# Patient Record
Sex: MALE | Race: WHITE
[De-identification: names, ages, dates, MRNs, and addresses within clinical notes are randomized per-mention and may not be internally consistent; named-entity substitution may affect disease eponyms.]

---

## 2018-02-13 ENCOUNTER — HOSPITAL ENCOUNTER (OUTPATIENT)
Dept: HOSPITAL 92 - LABBT | Age: 78
Discharge: HOME | End: 2018-02-13
Attending: ORTHOPAEDIC SURGERY
Payer: MEDICARE

## 2018-02-13 ENCOUNTER — HOSPITAL ENCOUNTER (INPATIENT)
Dept: HOSPITAL 92 - SURG A | Age: 78
LOS: 15 days | Discharge: HOME | DRG: 470 | End: 2018-02-28
Attending: ORTHOPAEDIC SURGERY | Admitting: ORTHOPAEDIC SURGERY
Payer: MEDICARE

## 2018-02-13 VITALS — BODY MASS INDEX: 38 KG/M2

## 2018-02-13 DIAGNOSIS — Z95.1: ICD-10-CM

## 2018-02-13 DIAGNOSIS — E03.9: ICD-10-CM

## 2018-02-13 DIAGNOSIS — Z01.812: ICD-10-CM

## 2018-02-13 DIAGNOSIS — Z91.041: ICD-10-CM

## 2018-02-13 DIAGNOSIS — Z79.82: ICD-10-CM

## 2018-02-13 DIAGNOSIS — I10: ICD-10-CM

## 2018-02-13 DIAGNOSIS — E66.9: ICD-10-CM

## 2018-02-13 DIAGNOSIS — Z86.73: ICD-10-CM

## 2018-02-13 DIAGNOSIS — Z85.828: ICD-10-CM

## 2018-02-13 DIAGNOSIS — M17.11: ICD-10-CM

## 2018-02-13 DIAGNOSIS — E11.42: ICD-10-CM

## 2018-02-13 DIAGNOSIS — Z96.652: ICD-10-CM

## 2018-02-13 DIAGNOSIS — M25.461: ICD-10-CM

## 2018-02-13 DIAGNOSIS — Z95.820: ICD-10-CM

## 2018-02-13 DIAGNOSIS — N40.0: ICD-10-CM

## 2018-02-13 DIAGNOSIS — E78.5: ICD-10-CM

## 2018-02-13 DIAGNOSIS — G47.30: ICD-10-CM

## 2018-02-13 DIAGNOSIS — M17.11: Primary | ICD-10-CM

## 2018-02-13 DIAGNOSIS — Z01.810: Primary | ICD-10-CM

## 2018-02-13 DIAGNOSIS — Z79.4: ICD-10-CM

## 2018-02-13 PROCEDURE — 36416 COLLJ CAPILLARY BLOOD SPEC: CPT

## 2018-02-13 PROCEDURE — 84132 ASSAY OF SERUM POTASSIUM: CPT

## 2018-02-13 PROCEDURE — 93005 ELECTROCARDIOGRAM TRACING: CPT

## 2018-02-13 PROCEDURE — C1713 ANCHOR/SCREW BN/BN,TIS/BN: HCPCS

## 2018-02-13 PROCEDURE — 94640 AIRWAY INHALATION TREATMENT: CPT

## 2018-02-13 PROCEDURE — 84295 ASSAY OF SERUM SODIUM: CPT

## 2018-02-13 PROCEDURE — 93010 ELECTROCARDIOGRAM REPORT: CPT

## 2018-02-13 PROCEDURE — S0020 INJECTION, BUPIVICAINE HYDRO: HCPCS

## 2018-02-13 PROCEDURE — C1776 JOINT DEVICE (IMPLANTABLE): HCPCS

## 2018-02-13 PROCEDURE — 80048 BASIC METABOLIC PNL TOTAL CA: CPT

## 2018-02-13 PROCEDURE — 36415 COLL VENOUS BLD VENIPUNCTURE: CPT

## 2018-02-13 PROCEDURE — 85027 COMPLETE CBC AUTOMATED: CPT

## 2018-02-13 PROCEDURE — 87081 CULTURE SCREEN ONLY: CPT

## 2018-02-13 PROCEDURE — 83930 ASSAY OF BLOOD OSMOLALITY: CPT

## 2018-02-19 ENCOUNTER — HOSPITAL ENCOUNTER (OUTPATIENT)
Dept: HOSPITAL 92 - LABBT | Age: 78
Discharge: HOME | End: 2018-02-19
Attending: ORTHOPAEDIC SURGERY
Payer: MEDICARE

## 2018-02-19 DIAGNOSIS — M17.11: ICD-10-CM

## 2018-02-19 DIAGNOSIS — Z01.818: Primary | ICD-10-CM

## 2018-02-19 LAB
ANION GAP SERPL CALC-SCNC: 10 MMOL/L (ref 10–20)
BUN SERPL-MCNC: 15 MG/DL (ref 8.4–25.7)
CALCIUM SERPL-MCNC: 9.8 MG/DL (ref 7.8–10.44)
CHLORIDE SERPL-SCNC: 96 MMOL/L (ref 98–107)
CO2 SERPL-SCNC: 26 MMOL/L (ref 23–31)
CREAT CL PREDICTED SERPL C-G-VRATE: 0 ML/MIN (ref 70–130)
CRYSTAL-AUWI FLAG: 0 (ref 0–15)
GLUCOSE SERPL-MCNC: 119 MG/DL (ref 83–110)
HEV IGM SER QL: 0.6 (ref 0–7.99)
HGB BLD-MCNC: 13.9 G/DL (ref 14–18)
HYALINE CASTS #/AREA URNS LPF: (no result) LPF
INR PPP: 1
MCH RBC QN AUTO: 31.5 PG (ref 27–31)
MCV RBC AUTO: 90.8 FL (ref 80–94)
PATHC CAST-AUWI FLAG: 0.13 (ref 0–2.49)
PLATELET # BLD AUTO: 147 THOU/UL (ref 130–400)
POTASSIUM SERPL-SCNC: 4.7 MMOL/L (ref 3.5–5.1)
PROTHROMBIN TIME: 13.5 SEC (ref 12–14.7)
RBC # BLD AUTO: 4.4 MILL/UL (ref 4.7–6.1)
RBC UR QL AUTO: (no result) HPF (ref 0–3)
SODIUM SERPL-SCNC: 127 MMOL/L (ref 136–145)
SP GR UR STRIP: 1.02 (ref 1–1.04)
SPERM-AUWI FLAG: 0 (ref 0–9.9)
WBC # BLD AUTO: 8.7 THOU/UL (ref 4.8–10.8)
YEAST-AUWI FLAG: 0 (ref 0–25)

## 2018-02-19 PROCEDURE — 85027 COMPLETE CBC AUTOMATED: CPT

## 2018-02-19 PROCEDURE — 86850 RBC ANTIBODY SCREEN: CPT

## 2018-02-19 PROCEDURE — 86901 BLOOD TYPING SEROLOGIC RH(D): CPT

## 2018-02-19 PROCEDURE — 86900 BLOOD TYPING SEROLOGIC ABO: CPT

## 2018-02-19 PROCEDURE — 85610 PROTHROMBIN TIME: CPT

## 2018-02-19 PROCEDURE — 81001 URINALYSIS AUTO W/SCOPE: CPT

## 2018-02-19 PROCEDURE — 80048 BASIC METABOLIC PNL TOTAL CA: CPT

## 2018-02-22 NOTE — HP
HISTORY OF PRESENT ILLNESS:  The patient is a 77-year-old male with a long history of progressive deg
enerative arthritis of the right knee, unresponsive to conservative treatment including rest, restric
tion of activities, and previous cortisone injections.  The pain is now interfering with day-to-day a
ctivities.  He has had previous left total knee replacement in 2011 with good results.

 

PAST MEDICAL HISTORY:  The patient has a history of diabetes, peripheral vascular disease and previou
s coronary bypass.  He has also had previous femoral popliteal grafting on the right side with resolu
tion of his symptoms of cramping and pain, but he continues to have knee pain.  He has been seen and 
cleared for surgery by his primary care physician, Dr. Salinas, and also his cardiologist in [place].

 

CURRENT MEDICATIONS:  Include Lantus, insulin, Synthroid, aspirin, Crestor, Bystolic.

 

ALLERGIES:  He is allergic to IODINE.

 

FAMILY HISTORY:  Otherwise unremarkable.

 

SOCIAL HISTORY:  Otherwise unremarkable.

 

REVIEW OF SYSTEMS:  Otherwise unremarkable.

 

PHYSICAL EXAMINATION:

GENERAL:  Reveals an elderly, heavyset male.

HEENT:  Unremarkable.

NECK:  Supple.

CHEST:  Clear.

HEART:  Regular rate and rhythm.

ABDOMEN:  Soft, nontender.

RECTAL/GENITAL:  Deferred.

EXTREMITIES:  Pertinent findings of the right knee:  There is puffiness but no definite effusion.  Th
ere is mild varus.  There is tenderness and crepitus over the medial joint line.  Range of motion is 
0-120 degrees.  There is no instability.  There is good capillary refill.  I cannot palpate distal pu
lses.  Slight right antalgic gait.  Neurovascular exam is intact.

 

LABORATORY AND X-RAY FINDINGS:  X-rays of the right knee reveal bone on bone collapse medially and va
scular calcifications.

 

IMPRESSION:

1.  Degenerative arthritis, right knee.

2.  Peripheral vascular disease.

3.  Atherosclerotic cardiovascular disease.

4.  Insulin-dependent diabetes.

5.  Status post left total knee replacement.

 

PLAN:  Right total knee replacement.  The nature of the surgery, length of recovery, and potential co
mplications such as infection, loss of motion, incomplete relief, delayed wound healing, neurovascula
r injury, thromboembolic phenomenon, possible transfusion, and need for revision have been discussed 
in detail.  Because of his other medical problems, he may require longer than usual hospital stay.

## 2018-02-26 LAB
POTASSIUM SERPL-SCNC: 4.3 MMOL/L (ref 3.5–5.1)
SODIUM SERPL-SCNC: 134 MMOL/L (ref 136–145)

## 2018-02-26 PROCEDURE — 3E0T3BZ INTRODUCTION OF ANESTHETIC AGENT INTO PERIPHERAL NERVES AND PLEXI, PERCUTANEOUS APPROACH: ICD-10-PCS | Performed by: ORTHOPAEDIC SURGERY

## 2018-02-26 PROCEDURE — 0SRC0J9 REPLACEMENT OF RIGHT KNEE JOINT WITH SYNTHETIC SUBSTITUTE, CEMENTED, OPEN APPROACH: ICD-10-PCS | Performed by: ORTHOPAEDIC SURGERY

## 2018-02-26 RX ADMIN — HYDROCODONE BITARTRATE AND ACETAMINOPHEN PRN TAB: 10; 325 TABLET ORAL at 22:25

## 2018-02-26 RX ADMIN — Medication SCH: at 19:48

## 2018-02-26 RX ADMIN — Medication SCH GM: at 18:31

## 2018-02-26 RX ADMIN — Medication SCH GM: at 20:18

## 2018-02-26 RX ADMIN — ALOGLIPTIN SCH MG: 6.25 TABLET, FILM COATED ORAL at 18:59

## 2018-02-26 RX ADMIN — HYDROCODONE BITARTRATE AND ACETAMINOPHEN PRN TAB: 10; 325 TABLET ORAL at 18:30

## 2018-02-26 RX ADMIN — ASPIRIN SCH MG: 81 TABLET ORAL at 20:22

## 2018-02-26 NOTE — OP
DATE OF PROCEDURE:  02/26/2018

 

PREOPERATIVE DIAGNOSIS:  End-stage tricompartmental osteoarthritis, right knee.

 

POSTOPERATIVE DIAGNOSIS:  End-stage tricompartmental osteoarthritis, right knee.

 

OPERATIVE PROCEDURE:  Cemented cruciate-sparing computer-assisted navigated 
right total knee arthroplasty.

 

SURGEON:  Cade Colvin M.D.

 

ASSISTANT:  Clayton Edwards PA-C.

 

ANESTHESIA:  General via laryngeal mask airway augmented with indwelling 
adductor canal block and a single shot sciatic block.

 

COMPONENTS USED:  [employer] cemented cruciate-sparing size 
6 primary femoral component with size 6 universal cemented baseplate.  An 11 mm 
polyethylene fixed bearing insert and A35 patellar button.

 

TOURNIQUET:  Not used.

 

ESTIMATED BLOOD LOSS:  400 mL.

 

FINDINGS:  End-stage severe degenerative tricompartmental disease, bone on bone 
arthrosis, periarticular osteophyte formation, large serous effusion, 
hypertrophic synovium.

 

DRAINS:  None.

 

SPECIMENS:  None.

 

COMPLICATIONS:  None.

 

COUNTS:  Correct.

 

INDICATIONS FOR SURGERY:  Nayan is a 77-year-old white male who has right 
knee pain amplified with standing and walking about 5-7 years.  He has failed 
conservative management and elected to proceed with total knee arthroplasty as 
definitive treatment of his pain.  Tourniquet was not used due to the patient's 
known femoropopliteal arterial bypass and known peripheral vascular disease.

 

PROCEDURE IN DETAIL:  After informed consent was obtained in the preoperative 
holding area.  The patient was taken to the operative suite where general 
anesthesia was induced.  Once adequate level of general anesthesia was obtained
, the patient was positioned and a well-padded tourniquet was placed around the 
right proximal thigh.  The right lower extremity was then prepped and draped in 
the usual sterile fashion.  A midline longitudinal incision was then made 
directly over the patella extending two fingerbreadths above the superior pole 
of the patella and two fingerbreadths inferior to the inferior patellar pole of 
the patella.  Deeper subcutaneous layers were dissected sharply and local 
bleeding was controlled with Bovie electrocautery.  A quad tendon longitudinal 
split was then made sharply and a median parapatellar arthrotomy was carried 
out both sharp and with Bovie electrocautery, carried down to one fingerbreadth 
medial to the tibial tubercle.  The knee was then placed into flexion and the 
patella was everted nicely, and a copious fat pad ectomy was performed allowing 
for greater exposure of the tibia.  The computer-assisted distal femoral 
fiducial was then placed and pinned firmly, and the distal femoral cutting 
guide was pinned firmly into place.  The oscillating saw was then used to 
remove the appropriate amount of bone.  The 4-in-1 cutting block was then 
placed on the distal femur and the oscillating saw was used to remove the 
appropriate amount of bone off of the anterior, posterior, and chamfer cuts.  
After completion of the chamfer cuts, the box-cutting guide was placed, 
malleted firmly into place and pinned securely, and an osteotome was used to 
make the distal cut and the oscillating saw was then used to make the medial 
and lateral box cuts.  This came out quite nicely and was removed with Bovie 
electrocautery, and the oscillating saw was then used to broaden the lateral 
medial walls of the box cut.  After completion of bone cuts, the anterior 
cruciate ligament was resected sharply and the posterior cruciate ligament 
retractor was placed and the tibia was subluxed for better exposure.  Partial 
meniscectomies were carried out, and the tibial computer-assisted fiducial was 
pinned, and the cutting guide was placed.  Oscillating saw was then used to 
remove the bone with Hohmann retractors used to take care and protect the 
collateral ligaments.  After the tibial resection was performed, a laminar 
 was placed in between the freshened bone cuts.  The knee placed at 90 
degrees and further bilateral meniscectomies were carried out, and the curved 
osteotome and curettage was used to remove any excess bone spurs in the 
posterior compartment.  The trial femoral component, tibial baseplate were 
placed with the appropriate polyethylene trial insert with an appropriate 
polyethylene spacer and patellar button.  The knee was taken through full range 
of motion with flexion and extension from 0-90 degrees and patellar broach 
squarely in the trochlea without any squinting or subluxation noted.  The knee 
was also stable to varus and valgus stressing at 0, 15, 45, and 90 degrees of 
flexion.  The drawer was negative. All trial components were then removed and 
the keel punch was used to provide the appropriate defect in the tibia with a 
mallet.  The freshened bone cuts were copiously irrigated with pulsatile lavage 
of about 1-1/2 liters to remove all excess debris.  The freshened bone cuts 
were then dried and with suction and lap sponge.  The knee was placed in 
flexion and retractors were placed to provide access to all bone cuts.  
Tobramycin impregnated methyl methacrylate cement was then placed on the 
freshened bone cuts and implants which were malleted firmly into place.  
Curettage and [place] elevators were used to remove any excess bone cement.  The 
knee was placed into full extension and the patellar button was placed under 
compression, and the cement was allowed to cure.  Once completed, the 
components were again taken through full range of motion and copious irrigation 
of the knee was carried out with another liter of normal saline.  All 
components were inspected fully with full range of motion and varus and valgus 
stressing. There was no laxity noted and full extension was observed 
clinically.  Primary closure was accomplished with #2 interrupted Vicryl stitch 
of the arthrotomy defect.  This was oversewn with a #2 running Quill barbed 
stitch.  The subcutaneous layer was then closed with a running 0 barbed 
Monocryl stitch and skin closure accomplished with a running subcuticular 3-0 
Monocryl barbed Quill stitch and augmented with cement on the skin.    Good 
distal pulses was noted clinically and a sterile dressing was applied to the 
incision.  The procedure was terminated without any complications.  The patient 
was awakened in the operative suite and taken to the recovery room in stable 
condition.

 

YU

## 2018-02-26 NOTE — RAD
TWO VIEWS RIGHT KNEE:

 

HISTORY: 

Right knee pain status post right knee arthroplasty.

 

FINDINGS: 

AP and lateral views of the right knee are obtained.

 

There has been a right knee arthroplasty.  Femoral and tibial components in good position.  Gas is se
en in the right knee joint.

 

There is marked atherosclerotic calcification of the superficial femoral artery and post trifurcation
 arteries.

 

IMPRESSION: 

Status post right knee arthroplasty.

 

POS: CET

## 2018-02-26 NOTE — PDOC.PN
- Subjective


Encounter Start Date: 02/26/18


Encounter Start Time: 20:34





Patient seen and examined. Pain controlled. No CP/SOB.





- Objective


MAR Reviewed: Yes


Vital Signs & Weight: 


 Vital Signs (12 hours)











  Temp Pulse Resp BP Pulse Ox


 


 02/26/18 20:24   71  16  149/63 H 


 


 02/26/18 16:00  97.6 F  66  18   100


 


 02/26/18 15:30  97.6 F  66  18  144/59 H  100








 Weight











Weight                         265 lb














I&O: 


 











 02/25/18 02/26/18 02/27/18





 06:59 06:59 06:59


 


Intake Total   1310


 


Output Total   300


 


Balance   1010











Result Diagrams: 


 02/26/18 10:15


Additional Labs: 


 Accuchecks











  02/26/18 02/26/18





  16:17 13:54


 


POC Glucose  99  97











EKG Reviewed by me: Yes (, RANDAL)





Phys Exam





- Physical Examination


Constitutional: NAD


Respiratory: no wheezing, no rales, no rhonchi


Cardiovascular: RRR, no rub


no heaves/pulsations


Gastrointestinal: soft, non-tender, no distention, positive bowel sounds


Musculoskeletal: no edema





Dx/Plan





- Plan


DVT proph w/SCDs





IMPRESSION:


1. DM2


2. CAD s/p CABG


3. PVD s/p fem-pop


4. HTN


5. HLD


6. h/o CVA


7. Hypothyroidism / Obesity BMI 38





PLAN:


* Add insulin sliding scale.


* Hold Lantus for now


* Cont current home meds as below


* DVT prophylaxis per protocol


* Cont Levothyroxine


* Cont Losartan











Review of Systems





- Review of Systems


Respiratory: negative: Cough, Dry, Shortness of Breath, Hemoptysis, SOB with 

Excertion, Pleuritic Pain, Sputum, Wheezing


Cardiovascular: negative: chest pain, palpitations, orthopnea, paroxysmal 

nocturnal dyspnea, edema, light headedness


Gastrointestinal: negative: Nausea, Vomiting, Abdominal Pain, Diarrhea, 

Constipation, Melena, Hematochezia





- Medications/Allergies


Allergies/Adverse Reactions: 


 Allergies











Allergy/AdvReac Type Severity Reaction Status Date / Time


 


Iodinated Contrast- Oral and Allergy  rash, Verified 02/13/18 08:53





IV Dye   flushing,  





   itching  











Medications: 


 Current Medications





Acetaminophen (Tylenol)  650 mg PO Q4H PRN


   PRN Reason: HA/ T > 101F; Mild Pain (1-3)


Hydrocodone Bitart/Acetaminophen (Norco 10/325)  1 tab PO Q4H PRN


   PRN Reason: Pain (1-3)


Hydrocodone Bitart/Acetaminophen (Norco 10/325)  2 tab PO Q4H PRN


   PRN Reason: PAIN (4-6)


   Last Admin: 02/26/18 18:30 Dose:  2 tab


Albuterol Sulfate (Proventil Hfa)  2 puff INH Q6H PRN


   PRN Reason: SOB &/or Wheezing


Albuterol/Ipratropium (Duoneb)  3 ml NEB [id number] PRN


   PRN Reason: SOB &/or Wheezing


Alogliptin Benzoate (Alogliptin)  12.5 mg PO BID-[facility]


   Last Admin: 02/26/18 18:59 Dose:  12.5 mg


Aspirin (Ecotrin)  81 mg PO BID [facility]


   Last Admin: 02/26/18 20:22 Dose:  81 mg


Cefazolin Sodium (Ancef)  2 gm SLOW IVP 0400,1200,2000 [facility]


   Stop: 02/27/18 04:01


   Last Admin: 02/26/18 20:18 Dose:  2 gm


Cyanocobalamin (Vitamin B-12)  100 mcg SC Q28D [facility]


Dextrose/Water (Dextrose 50%)  25 gm SLOW IVP PRN PRN


   PRN Reason: Hypoglycemia


Diphenhydramine HCl (Benadryl)  25 mg PO Q6H PRN


   PRN Reason: Itching


Fentanyl (Sublimaze)  50 mcg IV Q1H PRN


   PRN Reason: Moderate to Severe Pain (6-10)


   Last Admin: 02/26/18 20:22 Dose:  50 mcg


[facility] ([facility])  [facility]


Glucagon (Glucagon)  1 mg IM PRN PRN


   PRN Reason: Hypoglycemia


Ropivacaine 250 ml/ Device  250 mls @ 0 mls/hr NERVE BLCK INF [facility]


   PRN Reason: As Directed


Sodium Chloride (Normal Saline 0.9%)  1,000 mls @ 100 mls/hr IV .Q10H [facility]


   Last Admin: 02/26/18 16:48 Dose:  1,000 mls


Vancomycin HCl 2 gm/ Sodium (Chloride)  500 mls @ 250 mls/hr IVPB 2300 [facility]


   Stop: 02/27/18 00:59


Dextrose/Water (D5w)  1,000 mls @ 0 mls/hr IV .Q0M PRN; As Directed


   PRN Reason: Hypoglycemia


Insulin Human Regular (Humulin R)  0 units SC .MILD SLIDING SCALE PRN


   PRN Reason: Mild Correctional Scale


Insulin Human Regular (Humulin R)  0 units SC .BEDTIME SLIDING SC PRN


   PRN Reason: Bedtime Correctional Scale


Iron/Minerals/Multivitamins (Theragran M)  1 tab PO DAILY [facility]


Ketorolac Tromethamine (Toradol)  15 mg IVP 0300,0900,1500,2100 [facility]


   Stop: 02/28/18 15:01


   Last Admin: 02/26/18 20:16 Dose:  15 mg


Levothyroxine Sodium (Synthroid)  200 mcg PO 0600 [facility]


Losartan Potassium (Cozaar)  50 mg PO QAM [facility]


Losartan Potassium (Cozaar)  50 mg PO NOW [facility]


   Stop: 02/26/18 20:45


   Last Admin: 02/26/18 19:00 Dose:  50 mg


Metformin HCl (Glucophage)  1,000 mg PO BID-[facility]


   Last Admin: 02/26/18 18:30 Dose:  1,000 mg


Nebivolol (Bystolic)  2.5 mg PO QAM [facility]


Ondansetron HCl (Zofran)  4 mg IVP Q6H PRN


   PRN Reason: Nausea/Vomiting


Pioglitazone HCl (Actos)  15 mg PO QAM [facility]


Promethazine HCl (Phenergan)  12.5 mg IM Q4H PRN


   PRN Reason: Nausea/Vomiting


Rosuvastatin Calcium (Crestor)  20 mg PO HS [facility]


   Last Admin: 02/26/18 20:22 Dose:  20 mg


Senna/Docusate Sodium (Senokot S)  2 tab PO BID [facility]


Sodium Chloride (Flush - Normal Saline)  10 ml IVF PRN PRN


   PRN Reason: Saline Flush


Tamsulosin HCl (Flomax)  0.4 mg PO HS [facility]


   Last Admin: 02/26/18 20:22 Dose:  0.4 mg


Tiotropium Bromide (Spiriva Handihaler)  18 mcg INH DAILY-RT [facility]


Zolpidem Tartrate (Ambien)  5 mg PO HSPRN PRN


   PRN Reason: Insomnia

## 2018-02-27 LAB
HGB BLD-MCNC: 11.2 G/DL (ref 14–18)
MCH RBC QN AUTO: 31.1 PG (ref 27–31)
MCV RBC AUTO: 93.3 FL (ref 80–94)
PLATELET # BLD AUTO: 119 THOU/UL (ref 130–400)
RBC # BLD AUTO: 3.6 MILL/UL (ref 4.7–6.1)
WBC # BLD AUTO: 9.8 THOU/UL (ref 4.8–10.8)

## 2018-02-27 RX ADMIN — HYDROCODONE BITARTRATE AND ACETAMINOPHEN PRN TAB: 10; 325 TABLET ORAL at 02:12

## 2018-02-27 RX ADMIN — HYDROCODONE BITARTRATE AND ACETAMINOPHEN PRN TAB: 10; 325 TABLET ORAL at 06:36

## 2018-02-27 RX ADMIN — IPRATROPIUM BROMIDE SCH ML: 0.5 SOLUTION RESPIRATORY (INHALATION) at 00:52

## 2018-02-27 RX ADMIN — INSULIN HUMAN PRN UNIT: 100 INJECTION, SOLUTION PARENTERAL at 16:11

## 2018-02-27 RX ADMIN — ASPIRIN SCH MG: 81 TABLET ORAL at 08:54

## 2018-02-27 RX ADMIN — MULTIPLE VITAMINS W/ MINERALS TAB SCH TAB: TAB at 08:54

## 2018-02-27 RX ADMIN — IPRATROPIUM BROMIDE SCH ML: 0.5 SOLUTION RESPIRATORY (INHALATION) at 18:54

## 2018-02-27 RX ADMIN — Medication SCH GM: at 04:03

## 2018-02-27 RX ADMIN — ALOGLIPTIN SCH MG: 6.25 TABLET, FILM COATED ORAL at 17:59

## 2018-02-27 RX ADMIN — IPRATROPIUM BROMIDE SCH ML: 0.5 SOLUTION RESPIRATORY (INHALATION) at 12:46

## 2018-02-27 RX ADMIN — IPRATROPIUM BROMIDE SCH ML: 0.5 SOLUTION RESPIRATORY (INHALATION) at 07:40

## 2018-02-27 RX ADMIN — Medication PRN ML: at 12:03

## 2018-02-27 RX ADMIN — DOCUSATE SODIUM 50 MG AND SENNOSIDES 8.6 MG SCH TAB: 8.6; 5 TABLET, FILM COATED ORAL at 20:06

## 2018-02-27 RX ADMIN — ALOGLIPTIN SCH MG: 6.25 TABLET, FILM COATED ORAL at 08:55

## 2018-02-27 RX ADMIN — DOCUSATE SODIUM 50 MG AND SENNOSIDES 8.6 MG SCH TAB: 8.6; 5 TABLET, FILM COATED ORAL at 08:54

## 2018-02-27 RX ADMIN — Medication PRN ML: at 09:11

## 2018-02-27 RX ADMIN — HYDROCODONE BITARTRATE AND ACETAMINOPHEN PRN TAB: 10; 325 TABLET ORAL at 11:21

## 2018-02-27 RX ADMIN — ASPIRIN SCH MG: 81 TABLET ORAL at 20:06

## 2018-02-27 RX ADMIN — HYDROCODONE BITARTRATE AND ACETAMINOPHEN PRN TAB: 10; 325 TABLET ORAL at 18:03

## 2018-02-27 RX ADMIN — INSULIN HUMAN PRN UNIT: 100 INJECTION, SOLUTION PARENTERAL at 11:31

## 2018-02-27 RX ADMIN — HYDROCODONE BITARTRATE AND ACETAMINOPHEN PRN TAB: 10; 325 TABLET ORAL at 22:10

## 2018-02-27 NOTE — PDOC.PN
- Subjective


Encounter Start Date: 02/27/18


Encounter Start Time: 17:30





Patient seen and examined. Pain over the surgical site. No overnight events. 





- Objective


MAR Reviewed: Yes


Vital Signs & Weight: 


 Vital Signs (12 hours)











  Temp Pulse Resp BP Pulse Ox


 


 02/27/18 18:54   65  16   98


 


 02/27/18 15:58  98.7 F  82  16  147/64 H  97


 


 02/27/18 13:22  98.1 F  70  18  153/63 H  96


 


 02/27/18 09:55  98.5 F  64  18  155/70 H  98


 


 02/27/18 07:40   60  14  








 Weight











Admit Weight                   265 lb


 


Weight                         265 lb














I&O: 


 











 02/26/18 02/27/18 02/28/18





 06:59 06:59 06:59


 


Intake Total  2371 


 


Output Total  900 


 


Balance  1471 











Result Diagrams: 


 02/27/18 04:06





 02/26/18 10:15


Additional Labs: 


 Accuchecks











  02/27/18 02/27/18 02/27/18





  16:06 11:23 06:43


 


POC Glucose  184 H  182 H  148 H














Phys Exam





- Physical Examination


Constitutional: NAD


Respiratory: no wheezing, no rhonchi


Cardiovascular: RRR, no rub


Gastrointestinal: soft, non-tender, positive bowel sounds


Musculoskeletal: no edema


Neurological: moves all 4 limbs





Dx/Plan





- Plan


DVT proph w/SCDs





IMPRESSION:


1. DM2


2. CAD s/p CABG


3. PVD s/p fem-pop


4. HTN


5. HLD


6. h/o CVA


7. Hypothyroidism / Obesity BMI 38





PLAN:


* Add Levemir 30 units HS 


* Cont insulin sliding scale.


* Cont current home meds as below


* Cont Levothyroxine/Losartan











Review of Systems





- Review of Systems


Respiratory: negative: Cough, Dry, Shortness of Breath, Hemoptysis, SOB with 

Excertion, Pleuritic Pain, Sputum, Wheezing


Cardiovascular: negative: chest pain, palpitations, orthopnea, paroxysmal 

nocturnal dyspnea, edema, light headedness





- Medications/Allergies


Allergies/Adverse Reactions: 


 Allergies











Allergy/AdvReac Type Severity Reaction Status Date / Time


 


Iodinated Contrast- Oral and Allergy  rash, Verified 02/13/18 08:53





IV Dye   flushing,  





   itching  











Medications: 


 Current Medications





Acetaminophen (Tylenol)  650 mg PO Q4H PRN


   PRN Reason: HA/ T > 101F; Mild Pain (1-3)


Hydrocodone Bitart/Acetaminophen (Norco 10/325)  1 tab PO Q4H PRN


   PRN Reason: Pain (1-3)


Hydrocodone Bitart/Acetaminophen (Norco 10/325)  2 tab PO Q4H PRN


   PRN Reason: PAIN (4-6)


   Last Admin: 02/27/18 18:03 Dose:  2 tab


Albuterol Sulfate (Proventil Hfa)  2 puff INH Q6H PRN


   PRN Reason: SOB &/or Wheezing


Albuterol/Ipratropium (Duoneb)  3 ml NEB [id number] PRN


   PRN Reason: SOB &/or Wheezing


Alogliptin Benzoate (Alogliptin)  12.5 mg PO BID-[facility]


   Last Admin: 02/27/18 17:59 Dose:  12.5 mg


Aspirin (Ecotrin)  81 mg PO BID [facility]


   Last Admin: 02/27/18 08:54 Dose:  81 mg


Dextrose/Water (Dextrose 50%)  25 gm SLOW IVP PRN PRN


   PRN Reason: Hypoglycemia


Diphenhydramine HCl (Benadryl)  25 mg PO Q6H PRN


   PRN Reason: Itching


Fentanyl (Sublimaze)  50 mcg IV Q1H PRN


   PRN Reason: Moderate to Severe Pain (6-10)


   Last Admin: 02/27/18 12:01 Dose:  50 mcg


Ferrous Gluconate (Fergon)  324 mg PO BID-[facility]


   Last Admin: 02/27/18 17:59 Dose:  324 mg


Glucagon (Glucagon)  1 mg IM PRN PRN


   PRN Reason: Hypoglycemia


Ropivacaine 250 ml/ Device  250 mls @ 0 mls/hr NERVE BLCK INF [facility]


   PRN Reason: As Directed


Sodium Chloride (Normal Saline 0.9%)  1,000 mls @ 100 mls/hr IV .Q10H [facility]


   Last Admin: 02/27/18 15:21 Dose:  Not Given


Dextrose/Water (D5w)  1,000 mls @ 0 mls/hr IV .Q0M PRN; As Directed


   PRN Reason: Hypoglycemia


Insulin Detemir 30 units/ (Miscellaneous Medication)  0.3 mls @ 0 mls/hr SC [facility]


Insulin Human Regular (Humulin R)  0 units SC .MILD SLIDING SCALE PRN


   PRN Reason: Mild Correctional Scale


   Last Admin: 02/27/18 16:11 Dose:  2 unit


Insulin Human Regular (Humulin R)  0 units SC .BEDTIME SLIDING SC PRN


   PRN Reason: Bedtime Correctional Scale


Ipratropium Bromide (Atrovent)  2.5 ml NEB [id number] [facility]


   Last Admin: 02/27/18 18:54 Dose:  2.5 ml


Iron/Minerals/Multivitamins (Theragran M)  1 tab PO DAILY [facility]


   Last Admin: 02/27/18 08:54 Dose:  1 tab


Ketorolac Tromethamine (Toradol)  15 mg IVP 0300,0900,1500,2100 [facility]


   Stop: 02/28/18 15:01


   Last Admin: 02/27/18 14:06 Dose:  15 mg


Levothyroxine Sodium (Synthroid)  200 mcg PO 0600 [facility]


   Last Admin: 02/27/18 04:03 Dose:  200 mcg


Losartan Potassium (Cozaar)  50 mg PO QAM [facility]


   Last Admin: 02/27/18 08:54 Dose:  50 mg


Magnesium Hydroxide (Milk Of Magnesium)  30 ml PO DAILYPRN PRN


   PRN Reason: Constipation


Metformin HCl (Glucophage)  1,000 mg PO BID-[facility]


   Last Admin: 02/27/18 17:58 Dose:  1,000 mg


[facility] ([facility]stolic)  [facility]


   Last Admin: 02/27/18 09:06 Dose:  2.5 mg


Ondansetron HCl (Zofran)  4 mg IVP Q6H PRN


   PRN Reason: Nausea/Vomiting


Pioglitazone HCl (Actos)  [facility]


   Last Admin: 02/27/18 08:55 Dose:  15 mg


Promethazine HCl (Phenergan)  12.5 mg IM Q4H PRN


   PRN Reason: Nausea/Vomiting


Rosuvastatin Calcium (Crestor)  20 mg PO [facility]


   Last Admin: 02/26/18 20:22 Dose:  20 mg


Senna/Docusate Sodium (Senokot S)  2 tab PO BID [facility]


   Last Admin: 02/27/18 08:54 Dose:  2 tab


Sodium Chloride (Flush - Normal Saline)  10 ml IVF PRN PRN


   PRN Reason: Saline Flush


   Last Admin: 02/27/18 12:03 Dose:  10 ml


Tamsulosin HCl (Flomax)  0.4 mg PO [facility]


   Last Admin: 02/26/18 20:22 Dose:  0.4 mg


Tramadol HCl (Ultram)  50 mg PO Q6H PRN


   PRN Reason: PAIN SCALE 2-4


Tramadol HCl (Ultram)  100 mg PO Q6H PRN


   PRN Reason: PAIN SCALE 5-10


   Last Admin: 02/27/18 16:06 Dose:  100 mg


Zolpidem Tartrate (Ambien)  5 mg PO HSPRN PRN


   PRN Reason: Insomnia

## 2018-02-28 VITALS — TEMPERATURE: 98.1 F | SYSTOLIC BLOOD PRESSURE: 147 MMHG | DIASTOLIC BLOOD PRESSURE: 63 MMHG

## 2018-02-28 LAB
ANION GAP SERPL CALC-SCNC: 7 MMOL/L (ref 10–20)
ANION GAP SERPL CALC-SCNC: 9 MMOL/L (ref 10–20)
BUN SERPL-MCNC: 11 MG/DL (ref 8.4–25.7)
BUN SERPL-MCNC: 12 MG/DL (ref 8.4–25.7)
CALCIUM SERPL-MCNC: 8.9 MG/DL (ref 7.8–10.44)
CALCIUM SERPL-MCNC: 9.1 MG/DL (ref 7.8–10.44)
CHLORIDE SERPL-SCNC: 94 MMOL/L (ref 98–107)
CHLORIDE SERPL-SCNC: 96 MMOL/L (ref 98–107)
CO2 SERPL-SCNC: 27 MMOL/L (ref 23–31)
CO2 SERPL-SCNC: 29 MMOL/L (ref 23–31)
CREAT CL PREDICTED SERPL C-G-VRATE: 129 ML/MIN (ref 70–130)
CREAT CL PREDICTED SERPL C-G-VRATE: 140 ML/MIN (ref 70–130)
GLUCOSE SERPL-MCNC: 159 MG/DL (ref 83–110)
GLUCOSE SERPL-MCNC: 173 MG/DL (ref 83–110)
POTASSIUM SERPL-SCNC: 4 MMOL/L (ref 3.5–5.1)
POTASSIUM SERPL-SCNC: 4.3 MMOL/L (ref 3.5–5.1)
SODIUM SERPL-SCNC: 126 MMOL/L (ref 136–145)
SODIUM SERPL-SCNC: 128 MMOL/L (ref 136–145)

## 2018-02-28 RX ADMIN — INSULIN HUMAN PRN UNIT: 100 INJECTION, SOLUTION PARENTERAL at 05:53

## 2018-02-28 RX ADMIN — MULTIPLE VITAMINS W/ MINERALS TAB SCH TAB: TAB at 08:32

## 2018-02-28 RX ADMIN — HYDROCODONE BITARTRATE AND ACETAMINOPHEN PRN TAB: 10; 325 TABLET ORAL at 02:03

## 2018-02-28 RX ADMIN — HYDROCODONE BITARTRATE AND ACETAMINOPHEN PRN TAB: 10; 325 TABLET ORAL at 05:53

## 2018-02-28 RX ADMIN — IPRATROPIUM BROMIDE SCH ML: 0.5 SOLUTION RESPIRATORY (INHALATION) at 12:02

## 2018-02-28 RX ADMIN — IPRATROPIUM BROMIDE SCH ML: 0.5 SOLUTION RESPIRATORY (INHALATION) at 00:01

## 2018-02-28 RX ADMIN — ALOGLIPTIN SCH MG: 6.25 TABLET, FILM COATED ORAL at 08:31

## 2018-02-28 RX ADMIN — IPRATROPIUM BROMIDE SCH ML: 0.5 SOLUTION RESPIRATORY (INHALATION) at 07:22

## 2018-02-28 RX ADMIN — ASPIRIN SCH MG: 81 TABLET ORAL at 08:33

## 2018-02-28 RX ADMIN — DOCUSATE SODIUM 50 MG AND SENNOSIDES 8.6 MG SCH TAB: 8.6; 5 TABLET, FILM COATED ORAL at 08:32

## 2018-02-28 RX ADMIN — HYDROCODONE BITARTRATE AND ACETAMINOPHEN PRN TAB: 10; 325 TABLET ORAL at 13:48

## 2019-08-21 ENCOUNTER — HOSPITAL ENCOUNTER (OUTPATIENT)
Dept: HOSPITAL 92 - TBSIIMAG | Age: 79
Discharge: HOME | End: 2019-08-21
Attending: ORTHOPAEDIC SURGERY
Payer: MEDICARE

## 2019-08-21 DIAGNOSIS — M21.371: Primary | ICD-10-CM

## 2019-08-21 DIAGNOSIS — S96.811A: ICD-10-CM

## 2019-11-26 ENCOUNTER — HOSPITAL ENCOUNTER (OUTPATIENT)
Dept: HOSPITAL 92 - RAD | Age: 79
Discharge: HOME | End: 2019-11-26
Attending: INTERNAL MEDICINE
Payer: MEDICARE

## 2019-11-26 DIAGNOSIS — R06.00: Primary | ICD-10-CM

## 2019-11-26 PROCEDURE — 71046 X-RAY EXAM CHEST 2 VIEWS: CPT

## 2019-11-26 NOTE — RAD
Exam: Chest 2 views



HISTORY:Dyspnea



Comparison: 2/9/2016



FINDINGS:



Lungs: Bilateral interstitial prominence

Cardiac silhouette:Stable enlargement of cardiac silhouette. Prior sternotomy. Vascular calcification
.

Pulmonary vessels: Slight central engorgement

Pleural Spaces: Clear

Pneumothorax: None



Osseous abnormalities: None of acuity.



IMPRESSION: Findings which favor CHF. Correlate clinically.



Reported By: Kristian Wilhelm 

Electronically Signed:  11/26/2019 11:26 AM

## 2019-12-17 NOTE — RAD
CHEST 2 VIEWS:

 

Date:  12/17/19 

 

HISTORY:  

Dyspnea. 

 

COMPARISON:  

11/26/19. 

 

FINDINGS:

Stable appearing cardiomegaly, bilateral pleural thickening, and increased linear and interstitial ma
rkings bilaterally. No confluent pneumonia, overt edema, or pleural effusion. 

 

IMPRESSION: 

Stable chronic changes. No significant new process. 

 

 

POS: [facility]

## 2020-12-22 NOTE — RAD
EXAM:

Two views chest



PROVIDED CLINICAL HISTORY:

Dyspnea.



COMPARISON:

12/17/2019.



FINDINGS:

Postoperative changes related to CABG are again noted. Cardiac silhouette remains mildly enlarged. Th
e pulmonary vasculature is within normal limits. Linear increased density in the region of the

minor fissure may be related to minimal pleural thickening. No consolidation or pleural fluid is seen
 bilaterally. Vascular calcifications are again seen in the thoracic aorta. No interval change when

compared to prior exam.



IMPRESSION:

Stable mild chronic lung changes without evidence of an acute cardiopulmonary process..



Reported By: Paco Weston 

Electronically Signed:  12/22/2020 12:15 PM

## 2021-11-24 ENCOUNTER — HOSPITAL ENCOUNTER (OUTPATIENT)
Dept: HOSPITAL 92 - CSHCT | Age: 81
Discharge: HOME | End: 2021-11-24
Attending: UROLOGY
Payer: MEDICARE

## 2021-11-24 DIAGNOSIS — R31.29: Primary | ICD-10-CM

## 2021-11-24 DIAGNOSIS — N28.1: ICD-10-CM

## 2021-11-24 PROCEDURE — 74176 CT ABD & PELVIS W/O CONTRAST: CPT
